# Patient Record
(demographics unavailable — no encounter records)

---

## 2024-12-19 NOTE — NM
EXAMINATION TYPE: NM DatScan Brain SPECT

 

DATE OF EXAM: 12/18/2024

 

COMPARISON: CT brain July 31, 2024

 

CLINICAL INDICATION: Female, 58 years old with history of R25.1 TREMOR;

 

TECHNIQUE:  10 drops of Lugol's solution was administered 1 hour prior to injection as a thyroid bloc
nikolay agent.  After the administration of 4.9 mCi I-123 Ioflupane DaTscan.  Images obtained 3 hours po
st injection.  SPECT images of the brain were acquired with axial and coronal reconstructions.  

 

FINDINGS: 

The DaTSCAN demonstrates reduced uptake of tracer to the right posterior putamen.

 

IMPRESSION: 

This indicates the loss of the pre-synaptic dopaminergic terminals and is usually supportive of a cli
nical diagnosis of either idiopathic PD or PS. However, given the asymmetry of the two sides, an MRI 
may be considered to  ensure that this appearance is not due to a vascular etiology affecting the rig
ht striatal pathways.

 

[facility], Workstation: [id number], 12/19/2024 6:33 AM